# Patient Record
Sex: FEMALE
[De-identification: names, ages, dates, MRNs, and addresses within clinical notes are randomized per-mention and may not be internally consistent; named-entity substitution may affect disease eponyms.]

---

## 2020-10-29 ENCOUNTER — HOSPITAL ENCOUNTER (EMERGENCY)
Dept: HOSPITAL 88 - FSED | Age: 30
Discharge: HOME | End: 2020-10-29
Payer: COMMERCIAL

## 2020-10-29 VITALS — WEIGHT: 160 LBS | BODY MASS INDEX: 29.44 KG/M2 | HEIGHT: 62 IN

## 2020-10-29 DIAGNOSIS — R10.31: Primary | ICD-10-CM

## 2020-10-29 DIAGNOSIS — R42: ICD-10-CM

## 2020-10-29 PROCEDURE — 76856 US EXAM PELVIC COMPLETE: CPT

## 2020-10-29 PROCEDURE — 80076 HEPATIC FUNCTION PANEL: CPT

## 2020-10-29 PROCEDURE — 81025 URINE PREGNANCY TEST: CPT

## 2020-10-29 PROCEDURE — 99284 EMERGENCY DEPT VISIT MOD MDM: CPT

## 2020-10-29 PROCEDURE — 85025 COMPLETE CBC W/AUTO DIFF WBC: CPT

## 2020-10-29 PROCEDURE — 81003 URINALYSIS AUTO W/O SCOPE: CPT

## 2020-10-29 PROCEDURE — 80048 BASIC METABOLIC PNL TOTAL CA: CPT

## 2020-10-29 PROCEDURE — 74176 CT ABD & PELVIS W/O CONTRAST: CPT

## 2020-10-29 NOTE — DIAGNOSTIC IMAGING REPORT
EXAM: Transabdominal Pelvic Ultrasound

INDICATION:        

^pain  

COMPARISON: None 

TECHNIQUE: Grayscale transverse and sagittal transabdominal images were

obtained of the pelvis.  Spectral waveform analysis performed on both ovaries.



CLINICAL HISTORY:

30 year old  A0; last menstrual period: 10/13/2020.



FINDINGS:     



Uterus

     Orientation: Normal

     Size: 8.7 x 4.2 x 5.8 cm, Normal

     Mass: None

     Cervix: Normal 



Endometrium: 

Endometrial stripe not well evaluated due to transabdominal technique.



Right ovary: 

     Size:  2.1 x 2.2 x 2.9 cm

     Mass/Cyst: None



Left ovary:

     Size: 2.0 x 2.2 x 2.2 cm

     Mass/Cyst: None



Spectral waveform analysis demonstrates arterial and venous waveforms in both

ovaries



Adnexa: Normal



Cul-de-sac: No free fluid



IMPRESSION: 



1. Arterial and venous waveforms intact in normal-appearing ovaries. Low

suspicion for ovarian torsion.

2. Endometrial stripe not well visualized due to transabdominal technique.

Otherwise, normal-appearing uterus.



Signed by: Jacobo Parra MD on 10/29/2020 10:11 PM

## 2020-10-29 NOTE — EMERGENCY DEPARTMENT NOTE
History of Present Illnes


History of Present Illness


Chief Complaint:  Abdominal Complaints


History of Present Illness


This is a 30 year old  female Chief Complaint Comment PT C/O RLQ FOR 

PAST 24 HOURS, AS WELL AS DIZZINESS FOR SAME AMOUNT OF TIME, PT AMBULATED TO 

TRIAGE AREA WITHOUT ANY DIFFICULTY, STATES THE DIZZINESS FEELS LIKE THE ROOM IS 

SPINNING, STATED DURING TRIAGE IT LOOKED LIKE I WAS SPINNING? THINKS IT MAY BE 

RELATED TO ANEMIA WHICH SHE HAD IN THE PAST BEFORE. 


 .


Historian:  Patient


Arrival Mode:  Car


Onset (how long ago):  day(s) (2)


Location:  supra pubic


Quality:  dull


Radiation:  Denies non-radiation, Denies back, Denies neck, Denies extremity, 

Denies abdomen, Denies periumbilical, Denies flank, Denies proximal, Denies 

distal, Denies other


Severity:  mild


Onset quality:  gradual


Duration (how long):  day(s) (2)


Timing of current episode:  intermittent


Progression:  waxing and waning


Chronicity:  new


Context:  Denies recent illness, Denies recent surgery, Denies recent 

immobilization, Denies recent travel, Denies trauma/injury, Denies new 

medications, Denies hx of DVT/PE, Denies non-compliance w/ medications, Denies 

other


Relieving factors:  none


Exacerbating factors:  none


Associated symptoms:  Denies denies other symptoms, Denies confusion, Denies 

chest pain, Denies cough, Denies diaphoresis, Denies fever/chills, Denies 

headaches, Denies loss of appetite, Denies malaise, Denies nausea/vomiting, 

Denies rash, Denies seizure, Denies shortness of breath, Denies syncope, Denies 

weakness, Denies other


Treatments prior to arrival:  none





Past Medical/Family History


Physician Review


I have reviewed the patient's past medical and family history.  Any updates have

been documented here.





Past Medical History


Recent Fever:  No


Clinical Suspicion of Infectio:  No


New/Unexplained Change in Ment:  No


Past Medical History:  None


Past Surgical History:  





Social History


Smoking Cessation:  Never Smoker


Counseling Performed:  No


Alcohol Use:  None


Any Illegal Drug Use:  No





Other


Any Pre-Existing Lines (PICC,:  No





Review of Systems


Review of Systems


Constitutional:  Reports no symptoms


EENTM:  Reports no symptoms


Cardiovascular:  Reports no symptoms


Respiratory:  Reports no symptoms


Gastrointestinal:  Reports as per HPI


Genitourinary:  Reports no symptoms


Musculoskeletal:  Reports no symptoms


Integumentary:  Reports no symptoms


Neurological:  Reports other (dizzy)


Psychological:  Reports no symptoms


Endocrine:  Reports no symptoms


Hematological/Lymphatic:  Reports no symptoms





Physical Exam


Related Data


Allergies:  


Uncoded Allergies:  


     UNK ANTIBIOTIC (Allergy, Unknown, RASH, 10/29/20)


   STATES SHES ALLERGIC TO AN ABX THAT WAS GIVEN TO HER FOR


   INFECTION TO HER  OPENING.


Triage Vital Signs





Vital Signs








  Date Time  Temp Pulse Resp B/P (MAP) Pulse Ox O2 Delivery O2 Flow Rate FiO2


 


10/29/20 20:35 98.9 92 18 122/87 99 Room Air  








Vital signs reviewed:  Yes





Physical Exam


CONSTITUTIONAL





Constitutional:  Present well-developed, Present well-nourished


HENT


HENT:  Present normocephalic, Present atraumatic, Present oropharynx 

clear/moist, Present nose normal


HENT L/R:  Present left ext ear normal, Present right ext ear normal


EYES





Eyes:  Reports PERRL, Reports conjunctivae normal


NECK


Neck:  Present ROM normal


PULMONARY


Pulmonary:  Present effort normal, Present breath sounds normal; 


   Absent respiratory distress, Absent rales, Absent rhonchi, Absent chest 

tenderness, Absent other


CARDIOVASCULAR





Cardiovascular:  Present regular rhythm, Present heart sounds normal, Present 

capillary refill normal, Present normal rate; 


   Absent irregular rhythm, Absent intact distal pulses, Absent tachycardia, 

Absent bradycardia, Absent murmur, Absent gallop, Absent friction rub, Absent 

palpable pulses, Absent strong pulses, Absent weak pulses, Absent LLE edema, 

Absent RLE edema, Absent other


GASTROINTESTINAL





Abdominal:  Present soft, Present bowel sounds normal, Present tender (supra 

pubic); 


   Absent distension, Absent guarding, Absent mass, Absent rebound, Absent 

hernia, Absent left CVA tenderness, Absent right CVA tenderness, Absent other


GENITOURINARY





Genitourinary:  Present exam deferred; 


   Absent vagina normal, Absent uterus normal, Absent guaiac result, Absent 

vaginal discharge, Absent other


SKIN


Skin:  Present warm, Present dry; 


   Absent erythema, Absent pale, Absent rash, Absent jaundiced, Absent bruising,

Absent lesion, Absent other


MUSCULOSKELETAL





Musculoskeletal:  Present ROM normal


NEUROLOGICAL





Neurological:  Present alert, Present oriented x 3, Present no gross motor or 

sensory deficits


PSYCHOLOGICAL


Psychological:  Present mood/affect normal, Present judgement normal





Results


Laboratory


Lab results reviewed:  Yes





Imaging


Imaging results reviewed:  Yes





Assessment & Plan


Medical Decision Making


MDM


appendicitis   ovarian ctst





Reassessment


Reassessment time:  22:25


Reassessment


better





Assessment & Plan


Final Impression:  


(1) Abdominal pain


(2) Dizziness


Depart Disposition:  HOME, SELF-CARE


Last Vital Signs











  Date Time  Temp Pulse Resp B/P (MAP) Pulse Ox O2 Delivery O2 Flow Rate FiO2


 


10/29/20 20:35 98.9 92 18 122/87 99 Room Air  

















ILEANA BAR MD             Oct 29, 2020 22:25

## 2020-10-29 NOTE — DIAGNOSTIC IMAGING REPORT
EXAM: CT Abdomen and Pelvis WITHOUT contrast  

INDICATION:      

^pain

^[id number]

^2130 

COMPARISON: None.

TECHNIQUE: Abdomen and pelvis were scanned utilizing a multidetector helical

scanner from the lung base to the pubic symphysis without administration of IV

contrast. Absence of intravenous contrast decreases sensitivity for detection

of focal lesions and vascular pathology. Coronal and sagittal reformations were

obtained. Routine protocol was performed. 

     IV CONTRAST: None

     ORAL CONTRAST: None

            

COMPLICATIONS: None



FINDINGS:



LINES and TUBES: None.



LOWER THORAX:  Unremarkable



HEPATOBILIARY: No significant abnormality within the limits of noncontrast

technique.



GALLBLADDER: No radio-opaque stones or sludge.  No wall thickening.



SPLEEN: No splenomegaly. 



PANCREAS: No focal masses or ductal dilatation.  



ADRENALS: No adrenal nodules    



KIDNEYS/URETERS:  No hydronephrosis. No cystic or solid mass lesions.  No

stones.



GI TRACT: No abnormal distention, wall thickening, or evidence of bowel

obstruction.       Appendix is normal.



PELVIC ORGANS/BLADDER: Unremarkable.



LYMPH NODES: No lymphadenopathy.



VESSELS: Unremarkable.



PERITONEUM / RETROPERITONEUM: No free air or fluid.



BONES: Unremarkable.



SOFT TISSUES: There is a fat containing para-umbilical hernia.



IMPRESSION: 

Lack of IV contrast limits evaluation of the viscera and vasculature.



No acute abdominopelvic process within the limits of noncontrast technique.



Signed by: Jacobo Parra MD on 10/29/2020 10:00 PM